# Patient Record
Sex: MALE | Race: OTHER | NOT HISPANIC OR LATINO | Employment: UNEMPLOYED | URBAN - METROPOLITAN AREA
[De-identification: names, ages, dates, MRNs, and addresses within clinical notes are randomized per-mention and may not be internally consistent; named-entity substitution may affect disease eponyms.]

---

## 2022-07-13 ENCOUNTER — APPOINTMENT (EMERGENCY)
Dept: RADIOLOGY | Facility: HOSPITAL | Age: 77
End: 2022-07-13
Payer: MEDICAID

## 2022-07-13 ENCOUNTER — HOSPITAL ENCOUNTER (OUTPATIENT)
Facility: HOSPITAL | Age: 77
Setting detail: OBSERVATION
Discharge: HOME | End: 2022-07-15
Attending: EMERGENCY MEDICINE | Admitting: FAMILY MEDICINE
Payer: MEDICAID

## 2022-07-13 DIAGNOSIS — R91.8 OPACITY OF LUNG ON IMAGING STUDY: ICD-10-CM

## 2022-07-13 DIAGNOSIS — N40.0 PROSTATE ENLARGEMENT: ICD-10-CM

## 2022-07-13 DIAGNOSIS — N13.39 OTHER HYDRONEPHROSIS: Primary | ICD-10-CM

## 2022-07-13 DIAGNOSIS — I71.40 ABDOMINAL AORTIC ANEURYSM (AAA) WITHOUT RUPTURE: ICD-10-CM

## 2022-07-13 DIAGNOSIS — N12 PYELONEPHRITIS: ICD-10-CM

## 2022-07-13 DIAGNOSIS — N42.89 PROSTATE MASS: ICD-10-CM

## 2022-07-13 PROBLEM — N13.30 HYDRONEPHROSIS OF RIGHT KIDNEY: Status: ACTIVE | Noted: 2022-07-13

## 2022-07-13 PROBLEM — I71.4 AAA (ABDOMINAL AORTIC ANEURYSM) WITHOUT RUPTURE (HCC): Status: ACTIVE | Noted: 2022-07-13

## 2022-07-13 LAB
ALBUMIN SERPL BCP-MCNC: 2.7 G/DL (ref 3.5–5)
ALP SERPL-CCNC: 108 U/L (ref 46–116)
ALT SERPL W P-5'-P-CCNC: 54 U/L (ref 12–78)
ANION GAP SERPL CALCULATED.3IONS-SCNC: 8 MMOL/L (ref 4–13)
APTT PPP: 30 SECONDS (ref 23–37)
AST SERPL W P-5'-P-CCNC: 40 U/L (ref 5–45)
BACTERIA UR QL AUTO: NORMAL /HPF
BASOPHILS # BLD AUTO: 0.03 THOUSANDS/ΜL (ref 0–0.1)
BASOPHILS NFR BLD AUTO: 0 % (ref 0–1)
BILIRUB SERPL-MCNC: 0.55 MG/DL (ref 0.2–1)
BILIRUB UR QL STRIP: NEGATIVE
BUN SERPL-MCNC: 15 MG/DL (ref 5–25)
CALCIUM ALBUM COR SERPL-MCNC: 9.7 MG/DL (ref 8.3–10.1)
CALCIUM SERPL-MCNC: 8.7 MG/DL (ref 8.3–10.1)
CHLORIDE SERPL-SCNC: 100 MMOL/L (ref 100–108)
CLARITY UR: CLEAR
CO2 SERPL-SCNC: 26 MMOL/L (ref 21–32)
COLOR UR: YELLOW
CREAT SERPL-MCNC: 1.05 MG/DL (ref 0.6–1.3)
EOSINOPHIL # BLD AUTO: 0.09 THOUSAND/ΜL (ref 0–0.61)
EOSINOPHIL NFR BLD AUTO: 1 % (ref 0–6)
ERYTHROCYTE [DISTWIDTH] IN BLOOD BY AUTOMATED COUNT: 14.3 % (ref 11.6–15.1)
GFR SERPL CREATININE-BSD FRML MDRD: 69 ML/MIN/1.73SQ M
GLUCOSE SERPL-MCNC: 110 MG/DL (ref 65–140)
GLUCOSE UR STRIP-MCNC: NEGATIVE MG/DL
HCT VFR BLD AUTO: 37.8 % (ref 36.5–49.3)
HGB BLD-MCNC: 11.9 G/DL (ref 12–17)
HGB UR QL STRIP.AUTO: ABNORMAL
IMM GRANULOCYTES # BLD AUTO: 0.05 THOUSAND/UL (ref 0–0.2)
IMM GRANULOCYTES NFR BLD AUTO: 1 % (ref 0–2)
INR PPP: 1.15 (ref 0.84–1.19)
KETONES UR STRIP-MCNC: NEGATIVE MG/DL
LACTATE SERPL-SCNC: 0.8 MMOL/L (ref 0.5–2)
LEUKOCYTE ESTERASE UR QL STRIP: NEGATIVE
LYMPHOCYTES # BLD AUTO: 1.6 THOUSANDS/ΜL (ref 0.6–4.47)
LYMPHOCYTES NFR BLD AUTO: 16 % (ref 14–44)
MCH RBC QN AUTO: 27.6 PG (ref 26.8–34.3)
MCHC RBC AUTO-ENTMCNC: 31.5 G/DL (ref 31.4–37.4)
MCV RBC AUTO: 88 FL (ref 82–98)
MONOCYTES # BLD AUTO: 1.02 THOUSAND/ΜL (ref 0.17–1.22)
MONOCYTES NFR BLD AUTO: 11 % (ref 4–12)
NEUTROPHILS # BLD AUTO: 6.95 THOUSANDS/ΜL (ref 1.85–7.62)
NEUTS SEG NFR BLD AUTO: 71 % (ref 43–75)
NITRITE UR QL STRIP: NEGATIVE
NON-SQ EPI CELLS URNS QL MICRO: NORMAL /HPF
NRBC BLD AUTO-RTO: 0 /100 WBCS
PH UR STRIP.AUTO: 6 [PH]
PLATELET # BLD AUTO: 297 THOUSANDS/UL (ref 149–390)
PMV BLD AUTO: 9.5 FL (ref 8.9–12.7)
POTASSIUM SERPL-SCNC: 4 MMOL/L (ref 3.5–5.3)
PROCALCITONIN SERPL-MCNC: 0.08 NG/ML
PROT SERPL-MCNC: 7.8 G/DL (ref 6.4–8.2)
PROT UR STRIP-MCNC: ABNORMAL MG/DL
PROTHROMBIN TIME: 14.4 SECONDS (ref 11.6–14.5)
RBC # BLD AUTO: 4.31 MILLION/UL (ref 3.88–5.62)
RBC #/AREA URNS AUTO: NORMAL /HPF
SODIUM SERPL-SCNC: 134 MMOL/L (ref 136–145)
SP GR UR STRIP.AUTO: 1.02 (ref 1–1.03)
UROBILINOGEN UR QL STRIP.AUTO: 1 E.U./DL
WBC # BLD AUTO: 9.74 THOUSAND/UL (ref 4.31–10.16)
WBC #/AREA URNS AUTO: NORMAL /HPF

## 2022-07-13 PROCEDURE — 99220 PR INITIAL OBSERVATION CARE/DAY 70 MINUTES: CPT | Performed by: FAMILY MEDICINE

## 2022-07-13 PROCEDURE — 96365 THER/PROPH/DIAG IV INF INIT: CPT

## 2022-07-13 PROCEDURE — 87040 BLOOD CULTURE FOR BACTERIA: CPT | Performed by: PHYSICIAN ASSISTANT

## 2022-07-13 PROCEDURE — 87086 URINE CULTURE/COLONY COUNT: CPT | Performed by: FAMILY MEDICINE

## 2022-07-13 PROCEDURE — 83605 ASSAY OF LACTIC ACID: CPT | Performed by: PHYSICIAN ASSISTANT

## 2022-07-13 PROCEDURE — 93005 ELECTROCARDIOGRAM TRACING: CPT

## 2022-07-13 PROCEDURE — 71045 X-RAY EXAM CHEST 1 VIEW: CPT

## 2022-07-13 PROCEDURE — 74176 CT ABD & PELVIS W/O CONTRAST: CPT

## 2022-07-13 PROCEDURE — 99285 EMERGENCY DEPT VISIT HI MDM: CPT | Performed by: PHYSICIAN ASSISTANT

## 2022-07-13 PROCEDURE — 85610 PROTHROMBIN TIME: CPT | Performed by: PHYSICIAN ASSISTANT

## 2022-07-13 PROCEDURE — 84145 PROCALCITONIN (PCT): CPT | Performed by: PHYSICIAN ASSISTANT

## 2022-07-13 PROCEDURE — 99285 EMERGENCY DEPT VISIT HI MDM: CPT

## 2022-07-13 PROCEDURE — 80053 COMPREHEN METABOLIC PANEL: CPT | Performed by: PHYSICIAN ASSISTANT

## 2022-07-13 PROCEDURE — 36415 COLL VENOUS BLD VENIPUNCTURE: CPT | Performed by: PHYSICIAN ASSISTANT

## 2022-07-13 PROCEDURE — 85730 THROMBOPLASTIN TIME PARTIAL: CPT | Performed by: PHYSICIAN ASSISTANT

## 2022-07-13 PROCEDURE — G1004 CDSM NDSC: HCPCS

## 2022-07-13 PROCEDURE — 81001 URINALYSIS AUTO W/SCOPE: CPT | Performed by: PHYSICIAN ASSISTANT

## 2022-07-13 PROCEDURE — 85025 COMPLETE CBC W/AUTO DIFF WBC: CPT | Performed by: PHYSICIAN ASSISTANT

## 2022-07-13 RX ORDER — SODIUM CHLORIDE 9 MG/ML
75 INJECTION, SOLUTION INTRAVENOUS CONTINUOUS
Status: DISCONTINUED | OUTPATIENT
Start: 2022-07-13 | End: 2022-07-14

## 2022-07-13 RX ORDER — CEFTRIAXONE 1 G/50ML
1000 INJECTION, SOLUTION INTRAVENOUS EVERY 24 HOURS
Status: DISCONTINUED | OUTPATIENT
Start: 2022-07-14 | End: 2022-07-15 | Stop reason: HOSPADM

## 2022-07-13 RX ORDER — ENOXAPARIN SODIUM 100 MG/ML
40 INJECTION SUBCUTANEOUS DAILY
Status: DISCONTINUED | OUTPATIENT
Start: 2022-07-14 | End: 2022-07-15 | Stop reason: HOSPADM

## 2022-07-13 RX ORDER — CEFTRIAXONE 2 G/50ML
2000 INJECTION, SOLUTION INTRAVENOUS ONCE
Status: COMPLETED | OUTPATIENT
Start: 2022-07-13 | End: 2022-07-13

## 2022-07-13 RX ADMIN — CEFTRIAXONE 2000 MG: 2 INJECTION, SOLUTION INTRAVENOUS at 11:58

## 2022-07-13 RX ADMIN — SODIUM CHLORIDE 75 ML/HR: 0.9 INJECTION, SOLUTION INTRAVENOUS at 21:40

## 2022-07-13 NOTE — ED PROVIDER NOTES
History  Chief Complaint   Patient presents with    Flank Pain     Pt is visiting from [place], [address] of R side of flank and abd pain for the past 5 days  Speaks tano  Denies any n/v d  reg BM today, co increases urination at [facility], otherwise healthy no meds  Patient is a 68-year-old [address] speaking male who is visiting [place] from [place] and presents for evaluation of right flank and abdominal pain for the past 5 days  Patient sources frequent urination especially at night  He denies constipation, trauma, suspicious food intake, sick contacts, recent illness, or previous abdominal surgeries  He states the pain is awakening him from sleep  He denies nausea vomiting or diarrhea        History provided by:  Patient   used: Yes (praful [id number])    Flank Pain  Pain location:  R flank  Pain quality: sharp, shooting and stabbing    Pain radiates to:  RLQ  Pain severity:  Moderate  Onset quality:  Gradual  Duration:  5 days  Timing:  Constant  Progression:  Waxing and waning  Chronicity:  New  Context: not alcohol use, not awakening from sleep, not diet changes, not eating, not laxative use, not medication withdrawal, not previous surgeries, not recent illness, not recent sexual activity, not recent travel, not retching, not sick contacts, not suspicious food intake and not trauma    Relieved by:  None tried  Worsened by:  Nothing  Ineffective treatments:  None tried  Associated symptoms: no anorexia, no belching, no chest pain, no chills, no constipation, no cough, no diarrhea, no dysuria, no fatigue, no fever, no flatus, no hematemesis, no hematochezia, no hematuria, no melena, no nausea, no shortness of breath, no sore throat, no vaginal bleeding, no vaginal discharge and no vomiting    Risk factors: no alcohol abuse, no aspirin use, not elderly, has not had multiple surgeries, no NSAID use, not obese, not pregnant and no recent hospitalization        None       No past medical history on file  No past surgical history on file  No family history on file  I have reviewed and agree with the history as documented  No existing history information found  No existing history information found  Review of Systems   Constitutional: Negative for chills, fatigue and fever  HENT: Negative for ear pain and sore throat  Eyes: Negative for pain and visual disturbance  Respiratory: Negative for cough and shortness of breath  Cardiovascular: Negative for chest pain and palpitations  Gastrointestinal: Negative for abdominal pain, anorexia, constipation, diarrhea, flatus, hematemesis, hematochezia, melena, nausea and vomiting  Endocrine: Negative  Genitourinary: Positive for flank pain, frequency and urgency  Negative for dysuria, hematuria, testicular pain, vaginal bleeding and vaginal discharge  Musculoskeletal: Positive for back pain  Negative for arthralgias  Skin: Negative for color change and rash  Allergic/Immunologic: Negative  Neurological: Negative  Negative for seizures and syncope  Hematological: Negative  Psychiatric/Behavioral: Negative  All other systems reviewed and are negative  Physical Exam  Physical Exam  Vitals and nursing note reviewed  Constitutional:       Appearance: He is well-developed  He is obese  HENT:      Head: Normocephalic and atraumatic  Nose: Nose normal       Mouth/Throat:      Mouth: Mucous membranes are moist    Eyes:      General: No scleral icterus  Right eye: No discharge  Left eye: No discharge  Conjunctiva/sclera: Conjunctivae normal       Pupils: Pupils are equal, round, and reactive to light  Cardiovascular:      Rate and Rhythm: Normal rate and regular rhythm  Pulses: Normal pulses  Heart sounds: Normal heart sounds  No murmur heard  Pulmonary:      Effort: Pulmonary effort is normal  No respiratory distress  Breath sounds: Normal breath sounds     Abdominal: General: Bowel sounds are normal  There is distension  Palpations: Abdomen is soft  There is no shifting dullness or pulsatile mass  Tenderness: There is abdominal tenderness in the right upper quadrant and right lower quadrant  There is right CVA tenderness  There is no left CVA tenderness, guarding or rebound  Hernia: No hernia is present  Musculoskeletal:         General: No swelling or tenderness  Cervical back: Normal range of motion and neck supple  No rigidity  Right lower leg: No edema  Left lower leg: No edema  Skin:     General: Skin is warm and dry  Capillary Refill: Capillary refill takes less than 2 seconds  Neurological:      General: No focal deficit present  Mental Status: He is alert and oriented to person, place, and time  Cranial Nerves: No cranial nerve deficit  Psychiatric:         Mood and Affect: Mood normal          Behavior: Behavior normal          Vital Signs  ED Triage Vitals [07/13/22 1048]   Temperature Pulse Respirations Blood Pressure SpO2   98 4 °F (36 9 °C) 94 18 168/89 95 %      Temp Source Heart Rate Source Patient Position - Orthostatic VS BP Location FiO2 (%)   Oral Monitor Sitting Right arm --      Pain Score       9           Vitals:    07/13/22 1100 07/13/22 1115 07/13/22 1145 07/13/22 1530   BP: (!) 175/76 136/65 137/74 144/83   Pulse: 92 90 90 94   Patient Position - Orthostatic VS:             Visual Acuity      ED Medications  Medications   cefTRIAXone (ROCEPHIN) IVPB (premix in dextrose) 2,000 mg 50 mL (0 mg Intravenous Stopped 7/13/22 1228)       Diagnostic Studies  Results Reviewed     Procedure Component Value Units Date/Time    Urine culture [[id number]] Collected: 07/13/22 1158    Lab Status:  In process Specimen: Urine Updated: 07/13/22 1545    Blood culture #1 [[id number]] Collected: 07/13/22 1140    Lab Status: Preliminary result Specimen: Blood from Arm, Right Updated: 07/13/22 1504     Blood Culture Received in Microbiology Lab  Culture in Progress  Blood culture #2 [[id number]] Collected: 07/13/22 1140    Lab Status: Preliminary result Specimen: Blood from Arm, Left Updated: 07/13/22 1504     Blood Culture Received in Microbiology Lab  Culture in Progress  Procalcitonin [[id number]]  (Normal) Collected: 07/13/22 1140    Lab Status: Final result Specimen: Blood from Arm, Left Updated: 07/13/22 1222     Procalcitonin 0 08 ng/ml     Lactic acid [[id number]]  (Normal) Collected: 07/13/22 1140    Lab Status: Final result Specimen: Blood from Arm, Left Updated: 07/13/22 1214     LACTIC ACID 0 8 mmol/L     Narrative:      Result may be elevated if tourniquet was used during collection      Comprehensive metabolic panel [[id number]]  (Abnormal) Collected: 07/13/22 1140    Lab Status: Final result Specimen: Blood from Arm, Left Updated: 07/13/22 1211     Sodium 134 mmol/L      Potassium 4 0 mmol/L      Chloride 100 mmol/L      CO2 26 mmol/L      ANION GAP 8 mmol/L      BUN 15 mg/dL      Creatinine 1 05 mg/dL      Glucose 110 mg/dL      Calcium 8 7 mg/dL      Corrected Calcium 9 7 mg/dL      AST 40 U/L      ALT 54 U/L      Alkaline Phosphatase 108 U/L      Total Protein 7 8 g/dL      Albumin 2 7 g/dL      Total Bilirubin 0 55 mg/dL      eGFR 69 ml/min/1 73sq m     Narrative:      [place] guidelines for Chronic Kidney Disease (CKD):     Stage 1 with normal or high GFR (GFR > 90 mL/min/1 73 square meters)    Stage 2 Mild CKD (GFR = 60-89 mL/min/1 73 square meters)    Stage 3A Moderate CKD (GFR = 45-59 mL/min/1 73 square meters)    Stage 3B Moderate CKD (GFR = 30-44 mL/min/1 73 square meters)    Stage 4 Severe CKD (GFR = 15-29 mL/min/1 73 square meters)    Stage 5 End Stage CKD (GFR <15 mL/min/1 73 square meters)  Note: GFR calculation is accurate only with a steady state creatinine    Urine Microscopic [[id number]]  (Normal) Collected: 07/13/22 1158    Lab Status: Final result Specimen: Urine, Clean Catch Updated: 07/13/22 1209     RBC, UA 1-2 /hpf      WBC, UA None Seen /hpf      Epithelial Cells None Seen /hpf      Bacteria, UA Occasional /hpf     UA w Reflex to Microscopic w Reflex to Culture [[id number]]  (Abnormal) Collected: 07/13/22 1158    Lab Status: Final result Specimen: Urine, Clean Catch Updated: 07/13/22 1206     Color, UA Yellow     Clarity, UA Clear     Specific Gravity, UA 1 025     pH, UA 6 0     Leukocytes, UA Negative     Nitrite, UA Negative     Protein, UA 30 (1+) mg/dl      Glucose, UA Negative mg/dl      Ketones, UA Negative mg/dl      Urobilinogen, UA 1 0 E U /dl      Bilirubin, UA Negative     Occult Blood, UA Trace-Intact    Protime-INR [[id number]]  (Normal) Collected: 07/13/22 1140    Lab Status: Final result Specimen: Blood from Arm, Left Updated: 07/13/22 1205     Protime 14 4 seconds      INR 1 15    APTT [[id number]]  (Normal) Collected: 07/13/22 1140    Lab Status: Final result Specimen: Blood from Arm, Left Updated: 07/13/22 1205     PTT 30 seconds     CBC and differential [[id number]]  (Abnormal) Collected: 07/13/22 1140    Lab Status: Final result Specimen: Blood from Arm, Left Updated: 07/13/22 1154     WBC 9 74 Thousand/uL      RBC 4 31 Million/uL      Hemoglobin 11 9 g/dL      Hematocrit 37 8 %      MCV 88 fL      MCH 27 6 pg      MCHC 31 5 g/dL      RDW 14 3 %      MPV 9 5 fL      Platelets 457 Thousands/uL      nRBC 0 /100 WBCs      Neutrophils Relative 71 %      Immat GRANS % 1 %      Lymphocytes Relative 16 %      Monocytes Relative 11 %      Eosinophils Relative 1 %      Basophils Relative 0 %      Neutrophils Absolute 6 95 Thousands/µL      Immature Grans Absolute 0 05 Thousand/uL      Lymphocytes Absolute 1 60 Thousands/µL      Monocytes Absolute 1 02 Thousand/µL      Eosinophils Absolute 0 09 Thousand/µL      Basophils Absolute 0 03 Thousands/µL                  XR chest portable - 1 view   Final Result by Jensen Burris MD (07/13 1247)      Findings suspicious for early right basal infiltrate                  Workstation performed: [id number]         CT abdomen pelvis wo contrast   Final Result by Germaine Plummer MD (07/13 1248)      1  Severe right hydroureteronephrosis with abrupt caliber change at distal right ureter without obvious direct extrinsic mass affect  No calculus  Associated significant right perirenal and posterior pararenal fat stranding with fluid within posterior    pararenal space  Correlate with urinalysis for infection  Recommend urology consultation and further evaluation with CT urogram       2   Enlarged prostate with mass effect upon the base of urinary bladder  Possible separate irregular mucosal thickening / mass along the posterior inferior urinary bladder wall versus continuation of enlarged prostate projection  3   Right greater than left paraortic and iliac chain lymphadenopathy  4   Right pleural effusion and small subjacent consolidation likely atelectasis  Occult infection is not excluded  5   Large infrarenal abdominal aortic aneurysm measuring 5 6 x 5 5 cm  Recommend vascular service consultation  6   Left hepatic lobe 0 6 cm indeterminate lesion  7   Chronic severe L3 compression deformity with posterior buckling of superior endplate resulting in severe canal stenosis  The study was marked in [facility] for immediate notification        Workstation performed: [id number]                    Procedures  ECG 12 Lead Documentation Only    Date/Time: 7/13/2022 4:45 PM  Performed by: Justin Mendez PA-C  Authorized by: Justin Mendez PA-C     Patient location:  ED  Previous ECG:     Previous ECG:  Unavailable  Rate:     ECG rate:  81    ECG rate assessment: normal    Rhythm:     Rhythm: sinus rhythm               ED Course  ED Course as of 07/13/22 1648   Wed Jul 13, 2022   1252 UA w Reflex to Microscopic w Reflex to Culture(!)   1305 Creatinine: 1 05 MDM  Number of Diagnoses or Management Options  Abdominal aortic aneurysm (AAA) without rupture [facility]): new and requires workup  Other hydronephrosis: new and requires workup  Prostate mass: new and requires workup  Pyelonephritis: new and requires workup  Diagnosis management comments: Hydronephrosis with possible pyelonephritis  Patient was given IV antibiotics in the ED  Hydronephrosis may be obstructive in nature-CT without evidence of stone  Patient to be admitted for perc nephrostomy versus ureteral stent depending on approach and ability    CT reveals prostate mass  Discussed with patient with  and daughter Zeus Cid  Patient will need further evaluation with a urologist    Abdominal aortic aneurysm without rupture  New finding of 5 5 x 5 6 cm a abdominal aortic aneurysm in the infrarenal space  Patient will need vascular surgery follow-up intervention       Amount and/or Complexity of Data Reviewed  Clinical lab tests: ordered and reviewed  Tests in the radiology section of CPT®: ordered and reviewed  Tests in the medicine section of CPT®: ordered and reviewed  Discussion of test results with the performing providers: yes  Decide to obtain previous medical records or to obtain history from someone other than the patient: yes  Obtain history from someone other than the patient: yes  Review and summarize past medical records: yes  Discuss the patient with other providers: yes  Independent visualization of images, tracings, or specimens: yes    Risk of Complications, Morbidity, and/or Mortality  Presenting problems: high  Diagnostic procedures: high  Management options: high    Patient Progress  Patient progress: stable      Disposition  Final diagnoses:   Other hydronephrosis   Pyelonephritis   Abdominal aortic aneurysm (AAA) without rupture ([address] )   Prostate mass     Time reflects when diagnosis was documented in both MDM as applicable and the Disposition within this note     Time User Action Codes Description Comment    7/13/2022  3:25 PM Debby Stabs Add [N13 39] Other hydronephrosis     7/13/2022  3:26 PM Debby Stabs Add [N12] Pyelonephritis     7/13/2022  3:26 PM Tonia Londono Add [I71 4] Abdominal aortic aneurysm (AAA) without rupture ([facility] [address] )     7/13/2022  3:27 PM Debby Stabs Add [N42 89] Prostate mass       ED Disposition     ED Disposition   Admit    Condition   Stable    Date/Time   Wed Jul 13, 2022  3:05 PM    Comment   Case was discussed with BEATRIZ and the patient's admission status was agreed to be Admission Status: observation status to the service of Dr Cayetano Au   Follow-up Information    None         Patient's Medications    No medications on file       No discharge procedures on file      PDMP Review     None          ED Provider  Electronically Signed by           Siva Araiza PA-C  07/13/22 5064

## 2022-07-13 NOTE — ASSESSMENT & PLAN NOTE
Patient presented to the ER with complaints of right-sided flank pain, intermittent in nature over the last week associated with urinary frequency  · On imaging noted to have severe right hydroureteronephrosis with no obvious mass/calculus  PeriRenal and posterior pararenal fat stranding noted  · Continue Flomax on discharge  · Per Urology, patient to follow-up with urologist in [place] and at this time no urological intervention recommended  Patient and family aware of the  · UA shows trace blood, protein, occasional bacteria    Received Rocephin while here   · Urine culture with mixed contaminant

## 2022-07-13 NOTE — ASSESSMENT & PLAN NOTE
On x-ray noted to have early right basilar infiltrate  · Patient with low-grade fevers and intermittent cough  · On CT scan, right pleural effusion with small consolidation was noted which is likely atelectasis  · Treated with IV Rocephin here and transitioned to [facility] on discharge  · procalcitonin X 2 neg

## 2022-07-13 NOTE — CONSULTS
H&P Exam - Urology       Patient: Bo Contreras   : 1945 Sex: male   MRN: [id number]     CSN: [id number]      History of Present Illness   HPI:  Eliz Mondragon is a 78 y o  male visiting from [place] where he resides and returning in a few days who presents right lower quadrant and right groin pain seen in the ER today undergoing CT scan confirming severe right hydronephrotic kidney as well as bilateral inguinal hernias  Patient seen in the ER today stating that his discomfort is a 3/10 he denies any voiding issues with a fair stream going 6 times a day getting up no more than once a night also found to have aortic aneurysm        Review of Systems:   Constitutional:  Negative for activity change, fever, chills and diaphoresis  HENT: Negative for hearing loss and trouble swallowing  Eyes: Negative for itching and visual disturbance  Respiratory: Negative for chest tightness and shortness of breath  Cardiovascular: Negative for chest pain, edema  Gastrointestinal: Negative for abdominal distention, na abdominal pain, constipation, diarrhea, Nausea and vomiting  Genitourinary: Negative for decreased urine volume, difficulty urinating, dysuria, enuresis, frequency, hematuria and urgency  Musculoskeletal: Negative for gait problem and myalgias  Neurological: Negative for dizziness and headaches  Hematological: Does not bruise/bleed easily  Historical Information   No past medical history on file  No past surgical history on file  Social History   Social History     Substance and Sexual Activity   Alcohol Use Not on file     Social History     Substance and Sexual Activity   Drug Use Not on file     Social History     Tobacco Use   Smoking Status Not on file   Smokeless Tobacco Not on file     Family History: No family history on file      Meds/Allergies   (Not in a hospital admission)    No Known Allergies    Objective   Vitals: /75   Pulse 95   Temp 98 1 °F (36 7 °C) (Tympanic)   Resp 18   Ht 5' 10" (1 778 m)   Wt 79 8 kg (176 lb)   SpO2 95%   BMI 25 25 kg/m²     Physical Exam:  General Alert awake   Normocephalic atraumatic PERRLA  Lungs clear bilaterally  Cardiac normal S1 normal S2  Abdomen soft, flank pain none  Bilateral inguinal hernias  Tenderness at the right external ring  Extremities no edema    I/O last 24 hours:   In: 48 [IV Piggyback:50]  Out: -     Invasive Devices  Report    Peripheral Intravenous Line  Duration           Peripheral IV 07/13/22 Right Hand <1 day                    Lab Results: CBC:   Lab Results   Component Value Date    WBC 9 74 07/13/2022    HGB 11 9 (L) 07/13/2022    HCT 37 8 07/13/2022    MCV 88 07/13/2022     07/13/2022    MCH 27 6 07/13/2022    MCHC 31 5 07/13/2022    RDW 14 3 07/13/2022    MPV 9 5 07/13/2022    NRBC 0 07/13/2022     CMP:   Lab Results   Component Value Date     07/13/2022    CO2 26 07/13/2022    BUN 15 07/13/2022    CREATININE 1 05 07/13/2022    CALCIUM 8 7 07/13/2022    AST 40 07/13/2022    ALT 54 07/13/2022    ALKPHOS 108 07/13/2022    EGFR 69 07/13/2022     Urinalysis:   Lab Results   Component Value Date    COLORU Yellow 07/13/2022    CLARITYU Clear 07/13/2022    SPECGRAV 1 025 07/13/2022    PHUR 6 0 07/13/2022    LEUKOCYTESUR Negative 07/13/2022    NITRITE Negative 07/13/2022    GLUCOSEU Negative 07/13/2022    KETONESU Negative 07/13/2022    BILIRUBINUR Negative 07/13/2022    BLOODU Trace-Intact (A) 07/13/2022     Urine Culture: No results found for: URINECX  PSA: No results found for: PSA        Assessment/ Plan:  Chronic severe right hydronephrosis  Bilateral inguinal hernias  Questionable bladder mass middle lobe prostate  Right groin discomfort 3/10  Plan  Start tamsulosin 0 4 mg  Discussed with patient returning to [place] in a few days with minimal discomfort would prefer to put off cysto retrograde possible stent till returning to [place] with a local urologist        Stephanie Cage MD

## 2022-07-13 NOTE — ASSESSMENT & PLAN NOTE
5 6 X 5 5 cm infrarenal AAA noted  · Per ER, case discussed with vascular surgery who recommended outpatient follow-up  · Findings discussed with patient and family

## 2022-07-13 NOTE — ASSESSMENT & PLAN NOTE
On imaging noted to have prostatomegaly with mass effect on base of the bladder    Mucosal thickening/mass vs continuation of prostate projection was noted along the posterior inferior bladder wall  · Urology follow-up in [place] as noted above

## 2022-07-13 NOTE — H&P
History and Physical - [facility]    Patient Information: Mason Silvestre 78 y o  male MRN: [id number]  Unit/Bed#: ED 09 Encounter: [id number]  Admitting Physician: Carl Alegria DO  PCP: No primary care provider on file  Date of Admission:  07/13/22        Hospital Problem List:     Principal Problem:    Hydronephrosis of right kidney  Active Problems:    Prostate enlargement    AAA (abdominal aortic aneurysm) without rupture (HCC)    Opacity of lung on imaging study      Assessment/Plan:    * Hydronephrosis of right kidney  Assessment & Plan  Patient presented to the ER with complaints of right-sided flank pain, intermittent in nature over the last week associated with urinary frequency  · On imaging noted to have severe right hydroureteronephrosis with no obvious mass/calculus  PeriRenal and posterior pararenal fat stranding noted  · Keep NPO after midnight for possible urologic intervention  · Supportive treatment with IVF  · UA shows trace blood, protein, occasional bacteria  Received a dose of Rocephin which will be continued pending culture results    Prostate enlargement  Assessment & Plan  On imaging noted to have prostatomegaly with mass effect on base of the bladder    Mucosal thickening/mass vs continue a shin of prostate projection was noted along the posterior inferior bladder wall  · Urology consulted    AAA (abdominal aortic aneurysm) without rupture (HCC)  Assessment & Plan  5 6 X 5 5 cm infrarenal AAA noted  · Per ER, case was discussed with vascular surgery who recommended outpatient follow-up    Opacity of lung on imaging study  Assessment & Plan  On x-ray noted to have early right basilar infiltrate  · Doubtful pneumonia as patient is afebrile with no pulmonary symptoms  · On CT scan, right pleural effusion with small consolidation was noted which is likely atelectasis  · Check procalcitonin X 2          VTE Prophylaxis: Enoxaparin (Lovenox)  / sequential compression device Code Status: full code    Anticipated Length of Stay:  Patient will be admitted on an Observation basis with an anticipated length of stay of  1 midnights  Justification for Hospital Stay:  Severe right hydroureteronephrosis    Total Time for Visit, including Counseling / Coordination of Care: 45 minutes  Greater than 50% of this total time spent on direct patient counseling and coordination of care  Chief Complaint:     Flank Pain (Pt is visiting from [place], [address] of R side of flank and abd pain for the past 5 days  Speaks tano  Denies any n/v d  reg BM today, co increases urination at [facility], otherwise healthy no meds  )    History of Present Illness:    Eliz Lisa is a 78 y o  male who presents with intermittent right-sided flank pain radiating to the right lower back over the last week  States pain level is 3/10 in intensity  Reports urinary frequency but denies any dysuria, hematuria  Denies any prior episode of similar pain  Review of Systems:    Review of Systems   Constitutional: Negative for appetite change, chills and fever  HENT: Negative for congestion  Eyes: Negative for photophobia and visual disturbance  Respiratory: Negative for chest tightness and shortness of breath  Cardiovascular: Negative for chest pain and leg swelling  Gastrointestinal: Positive for abdominal pain  Negative for diarrhea, nausea and vomiting  Genitourinary: Positive for flank pain and frequency  Negative for dysuria and hematuria  Musculoskeletal: Positive for back pain  Skin: Negative for wound  Neurological: Negative for headaches  Hematological: Does not bruise/bleed easily  Psychiatric/Behavioral: Negative for agitation  Past Medical and Surgical History:     No past medical history on file  No past surgical history on file      Meds/Allergies:    PTA meds:   None       Allergies: No Known Allergies  History:     Marital Status: /Civil Union     Substance Use History:   Social History     Substance and Sexual Activity   Alcohol Use Not on file     Social History     Tobacco Use   Smoking Status Not on file   Smokeless Tobacco Not on file     Social History     Substance and Sexual Activity   Drug Use Not on file       Family History:    No family history on file  Physical Exam:     Vitals:   Blood Pressure: 144/83 (07/13/22 1530)  Pulse: 94 (07/13/22 1530)  Temperature: 98 4 °F (36 9 °C) (07/13/22 1048)  Temp Source: Oral (07/13/22 1048)  Respirations: 18 (07/13/22 1048)  Height: 5' 10" (177 8 cm) (07/13/22 1048)  Weight - Scale: 79 8 kg (176 lb) (07/13/22 1048)  SpO2: 92 % (07/13/22 1530)    Physical Exam  Constitutional:       General: He is not in acute distress  Appearance: He is not diaphoretic  HENT:      Head: Normocephalic and atraumatic  Eyes:      General:         Right eye: No discharge  Left eye: No discharge  Cardiovascular:      Rate and Rhythm: Normal rate and regular rhythm  Pulmonary:      Effort: Pulmonary effort is normal  No respiratory distress  Breath sounds: Normal breath sounds  No wheezing or rales  Abdominal:      General: Bowel sounds are normal  There is no distension  Palpations: Abdomen is soft  Tenderness: There is abdominal tenderness (Right flank/right upper quadrant)  There is no guarding  Comments: Right CVA tenderness noted   Musculoskeletal:      Right lower leg: No edema  Left lower leg: No edema  Neurological:      Mental Status: He is alert and oriented to person, place, and time  Lab Results: I have personally reviewed pertinent reports        Results from last 7 days   Lab Units 07/13/22  1140   WBC Thousand/uL 9 74   HEMOGLOBIN g/dL 11 9*   HEMATOCRIT % 37 8   PLATELETS Thousands/uL 297   NEUTROS PCT % 71   LYMPHS PCT % 16   MONOS PCT % 11   EOS PCT % 1     Results from last 7 days   Lab Units 07/13/22  1140   POTASSIUM mmol/L 4 0   CHLORIDE mmol/L 100   CO2 mmol/L 26   BUN mg/dL 15   CREATININE mg/dL 1 05   CALCIUM mg/dL 8 7   ALK PHOS U/L 108   ALT U/L 54   AST U/L 40     Results from last 7 days   Lab Units 07/13/22  1140   INR  1 15       Imaging: I have personally reviewed pertinent reports  CT abdomen pelvis wo contrast    Result Date: 7/13/2022  Narrative: CT ABDOMEN AND PELVIS WITHOUT IV CONTRAST INDICATION:   Flank pain, kidney stone suspected flank pain  COMPARISON:  None  TECHNIQUE:  CT examination of the abdomen and pelvis was performed without intravenous contrast  This examination was performed without intravenous contrast in the context of the critical nationwide Omnipaque shortage  Axial, sagittal, and coronal 2D reformatted images were created from the source data and submitted for interpretation  Radiation dose length product (DLP) for this visit:  490 28 mGy-cm   This examination, like all CT scans performed in the [facility], was performed utilizing techniques to minimize radiation dose exposure, including the use of iterative  reconstruction and automated exposure control  Enteric contrast was administered  FINDINGS: ABDOMEN LOWER CHEST:  Partially imaged right pleural effusion and small subjacent consolidation  There is right major fissure 6 mm lymph node (series 2 image 1) LIVER/BILIARY TREE:  Left hepatic lobe 0 6 cm indeterminate hypodense lesion (series 2 image 20) GALLBLADDER:  No calcified gallstones  No pericholecystic inflammatory change  SPLEEN:  Unremarkable  PANCREAS:  Unremarkable  ADRENAL GLANDS:  Unremarkable  KIDNEYS/URETERS:  There is severe right hydroureteronephrosis with abrupt caliber change at distal ureter (series 2 image 74) without obvious direct extrinsic mass effect  No calculus  Left kidney and collecting system appear unremarkable  STOMACH AND BOWEL:  Unremarkable  APPENDIX:  A normal appendix was visualized   ABDOMINOPELVIC CAVITY:  Significant right perirenal and posterior pararenal fat stranding with fluid within posterior pararenal space  Associated right greater than left para-aortic and iliac chain lymphadenopathy  A representative node in the right pelvis measures 1 x 2 6 cm (series 2 image 75)  VESSELS:  There is a large infrarenal abdominal aortic aneurysm measuring 5 6 x 5 5 cm (series 2 image 46) PELVIS REPRODUCTIVE ORGANS:  Prostate is enlarged with mass effect on base of urinary bladder  URINARY BLADDER:  Partially distended  There is a impression of mass effect upon the base of urinary bladder from enlarged prostate  Possible separate irregular mucosal thickening / mass along the posterior inferior urinary bladder wall versus continuation of prostate projection (series 602 image 99) ABDOMINAL WALL/INGUINAL REGIONS:  Fat-containing bilateral inguinal hernias  Small fat-containing umbilical hernia  OSSEOUS STRUCTURES:  Chronic severe L3 compression deformity with posterior buckling of the superior endplate resulting in severe canal stenosis  No acute or aggressive appearing osseous abnormality  Impression: 1  Severe right hydroureteronephrosis with abrupt caliber change at distal right ureter without obvious direct extrinsic mass affect  No calculus  Associated significant right perirenal and posterior pararenal fat stranding with fluid within posterior  pararenal space  Correlate with urinalysis for infection  Recommend urology consultation and further evaluation with CT urogram  2   Enlarged prostate with mass effect upon the base of urinary bladder  Possible separate irregular mucosal thickening / mass along the posterior inferior urinary bladder wall versus continuation of enlarged prostate projection  3   Right greater than left paraortic and iliac chain lymphadenopathy  4   Right pleural effusion and small subjacent consolidation likely atelectasis  Occult infection is not excluded  5   Large infrarenal abdominal aortic aneurysm measuring 5 6 x 5 5 cm    Recommend vascular service consultation  6   Left hepatic lobe 0 6 cm indeterminate lesion  7   Chronic severe L3 compression deformity with posterior buckling of superior endplate resulting in severe canal stenosis  The study was marked in [facility] for immediate notification  Workstation performed: [id number]     XR chest portable - 1 view    Result Date: 7/13/2022  Narrative: CHEST INDICATION:   sepsis  COMPARISON:  None EXAM PERFORMED/VIEWS:  XR CHEST PORTABLE Single view FINDINGS: Right basal opacity suspicious for early infiltrate Cardiomediastinal silhouette appears unremarkable  No pneumothorax or pleural effusion  Osseous structures appear within normal limits for patient age  Impression: Findings suspicious for early right basal infiltrate Workstation performed: [id number]       XR chest portable - 1 view   Final Result      Findings suspicious for early right basal infiltrate                  Workstation performed: [id number]         CT abdomen pelvis wo contrast   Final Result      1  Severe right hydroureteronephrosis with abrupt caliber change at distal right ureter without obvious direct extrinsic mass affect  No calculus  Associated significant right perirenal and posterior pararenal fat stranding with fluid within posterior    pararenal space  Correlate with urinalysis for infection  Recommend urology consultation and further evaluation with CT urogram       2   Enlarged prostate with mass effect upon the base of urinary bladder  Possible separate irregular mucosal thickening / mass along the posterior inferior urinary bladder wall versus continuation of enlarged prostate projection  3   Right greater than left paraortic and iliac chain lymphadenopathy  4   Right pleural effusion and small subjacent consolidation likely atelectasis  Occult infection is not excluded  5   Large infrarenal abdominal aortic aneurysm measuring 5 6 x 5 5 cm  Recommend vascular service consultation        6   Left hepatic lobe 0 6 cm indeterminate lesion  7   Chronic severe L3 compression deformity with posterior buckling of superior endplate resulting in severe canal stenosis  The study was marked in [facility] for immediate notification  Workstation performed: [id number]             EKG, Pathology, and Other Studies Reviewed on Admission:   · No ekg noted    Allscripts/EPIC Records Reviewed: Yes     ** Please Note: "This note has been constructed using a voice recognition system  Therefore there may be syntax, spelling, and/or grammatical errors   Please call if you have any questions  "**

## 2022-07-14 LAB
ANION GAP SERPL CALCULATED.3IONS-SCNC: 9 MMOL/L (ref 4–13)
BACTERIA UR CULT: NORMAL
BUN SERPL-MCNC: 16 MG/DL (ref 5–25)
CALCIUM SERPL-MCNC: 8.8 MG/DL (ref 8.3–10.1)
CHLORIDE SERPL-SCNC: 100 MMOL/L (ref 100–108)
CO2 SERPL-SCNC: 24 MMOL/L (ref 21–32)
CREAT SERPL-MCNC: 0.91 MG/DL (ref 0.6–1.3)
ERYTHROCYTE [DISTWIDTH] IN BLOOD BY AUTOMATED COUNT: 14.5 % (ref 11.6–15.1)
FLUAV RNA RESP QL NAA+PROBE: NEGATIVE
FLUBV RNA RESP QL NAA+PROBE: NEGATIVE
GFR SERPL CREATININE-BSD FRML MDRD: 82 ML/MIN/1.73SQ M
GLUCOSE P FAST SERPL-MCNC: 102 MG/DL (ref 65–99)
GLUCOSE SERPL-MCNC: 102 MG/DL (ref 65–140)
HCT VFR BLD AUTO: 38.1 % (ref 36.5–49.3)
HGB BLD-MCNC: 12 G/DL (ref 12–17)
MAGNESIUM SERPL-MCNC: 2 MG/DL (ref 1.6–2.6)
MCH RBC QN AUTO: 27.6 PG (ref 26.8–34.3)
MCHC RBC AUTO-ENTMCNC: 31.5 G/DL (ref 31.4–37.4)
MCV RBC AUTO: 88 FL (ref 82–98)
PLATELET # BLD AUTO: 310 THOUSANDS/UL (ref 149–390)
PMV BLD AUTO: 9.5 FL (ref 8.9–12.7)
POTASSIUM SERPL-SCNC: 4 MMOL/L (ref 3.5–5.3)
PROCALCITONIN SERPL-MCNC: 0.08 NG/ML
RBC # BLD AUTO: 4.35 MILLION/UL (ref 3.88–5.62)
RSV RNA RESP QL NAA+PROBE: NEGATIVE
SARS-COV-2 RNA RESP QL NAA+PROBE: NEGATIVE
SODIUM SERPL-SCNC: 133 MMOL/L (ref 136–145)
WBC # BLD AUTO: 8.47 THOUSAND/UL (ref 4.31–10.16)

## 2022-07-14 PROCEDURE — 0241U HB NFCT DS VIR RESP RNA 4 TRGT: CPT | Performed by: FAMILY MEDICINE

## 2022-07-14 PROCEDURE — 84145 PROCALCITONIN (PCT): CPT | Performed by: FAMILY MEDICINE

## 2022-07-14 PROCEDURE — 99225 PR SBSQ OBSERVATION CARE/DAY 25 MINUTES: CPT | Performed by: FAMILY MEDICINE

## 2022-07-14 PROCEDURE — 83735 ASSAY OF MAGNESIUM: CPT | Performed by: FAMILY MEDICINE

## 2022-07-14 PROCEDURE — 80048 BASIC METABOLIC PNL TOTAL CA: CPT | Performed by: FAMILY MEDICINE

## 2022-07-14 PROCEDURE — 85027 COMPLETE CBC AUTOMATED: CPT | Performed by: FAMILY MEDICINE

## 2022-07-14 RX ORDER — TAMSULOSIN HYDROCHLORIDE 0.4 MG/1
0.4 CAPSULE ORAL
Status: DISCONTINUED | OUTPATIENT
Start: 2022-07-14 | End: 2022-07-15

## 2022-07-14 RX ORDER — ACETAMINOPHEN 325 MG/1
650 TABLET ORAL EVERY 6 HOURS PRN
Status: DISCONTINUED | OUTPATIENT
Start: 2022-07-14 | End: 2022-07-15 | Stop reason: HOSPADM

## 2022-07-14 RX ADMIN — ACETAMINOPHEN 650 MG: 325 TABLET ORAL at 01:28

## 2022-07-14 RX ADMIN — ENOXAPARIN SODIUM 40 MG: 40 INJECTION SUBCUTANEOUS at 08:34

## 2022-07-14 RX ADMIN — ACETAMINOPHEN 650 MG: 325 TABLET ORAL at 20:55

## 2022-07-14 RX ADMIN — CEFTRIAXONE 1000 MG: 1 INJECTION, SOLUTION INTRAVENOUS at 10:37

## 2022-07-14 RX ADMIN — TAMSULOSIN HYDROCHLORIDE 0.4 MG: 0.4 CAPSULE ORAL at 15:42

## 2022-07-14 NOTE — UTILIZATION REVIEW
Initial Clinical Review    Admission: Date/Time/Statement:   Admission Orders (From admission, onward)     Ordered        07/13/22 1505  Place in Observation  Once                      Orders Placed This Encounter   Procedures    Place in Observation     Standing Status:   Standing     Number of Occurrences:   1     Order Specific Question:   Level of Care     Answer:   Med Surg [16]     ED Arrival Information     Expected   -    Arrival   7/13/2022 10:20    Acuity   Urgent            Means of arrival   Walk-In    Escorted by   Family Member    Service   Hospitalist    Admission type   Urgent            Arrival complaint   Flank pain; Abd pain & urinary frequency           Chief Complaint   Patient presents with    Flank Pain     Pt is visiting from [place], [facility] [address] of R side of flank and abd pain for the past 5 days  Speaks tano  Denies any n/v d  reg BM today, co increases urination at [facility], otherwise healthy no meds  Initial Presentation:   57-year-old Tano speaking male who is visiting [place] from [place] and presents for evaluation of right flank and abdominal pain for the past 5 days  Patient sources frequent urination especially at night  He denies constipation, trauma, suspicious food intake, sick contacts, recent illness, or previous abdominal surgeries  He states the pain is awakening him from sleep  He denies nausea vomiting or diarrhea  Hydronephrosis of right kidney  Assessment & Plan  Patient presented to the ER with complaints of right-sided flank pain, intermittent in nature over the last week associated with urinary frequency  · On imaging noted to have severe right hydroureteronephrosis with no obvious mass/calculus  PeriRenal and posterior pararenal fat stranding noted  · Keep NPO after midnight for possible urologic intervention  · Supportive treatment with IVF  · UA shows trace blood, protein, occasional bacteria    Received a dose of Rocephin which will be continued pending culture results        ED Triage Vitals [07/13/22 1048]   Temperature Pulse Respirations Blood Pressure SpO2   98 4 °F (36 9 °C) 94 18 168/89 95 %      Temp Source Heart Rate Source Patient Position - Orthostatic VS BP Location FiO2 (%)   Oral Monitor Sitting Right arm --      Pain Score       9          Wt Readings from Last 1 Encounters:   07/13/22 79 8 kg (176 lb)     Additional Vital Signs:   Date/Time Temp Pulse Resp BP MAP (mmHg) SpO2 O2 Device Patient Position - Orthostatic VS   07/14/22 06:36:42 98 3 °F (36 8 °C) 84 -- -- -- 86 % Abnormal  -- --   07/14/22 03:05:45 99 9 °F (37 7 °C) 83 16 116/66 83 93 % -- Lying   07/13/22 22:20:40 100 6 °F (38 1 °C) Abnormal  99 18 128/75 93 90 % -- --   07/13/22 20:41:39 100 6 °F (38 1 °C) Abnormal  95 19 162/84 110 93 % -- --   07/13/22 1900 -- 98 16 140/75 100 92 % None (Room air) Sitting     Pertinent Labs/Diagnostic Test Results:   XR chest portable - 1 view   Final Result by Leopoldo Atkinson MD (07/13 1244)      Findings suspicious for early right basal infiltrate                  Workstation performed: [id number]         CT abdomen pelvis wo contrast   Final Result by King Crystal MD (07/13 1246)      1  Severe right hydroureteronephrosis with abrupt caliber change at distal right ureter without obvious direct extrinsic mass affect  No calculus  Associated significant right perirenal and posterior pararenal fat stranding with fluid within posterior    pararenal space  Correlate with urinalysis for infection  Recommend urology consultation and further evaluation with CT urogram       2   Enlarged prostate with mass effect upon the base of urinary bladder  Possible separate irregular mucosal thickening / mass along the posterior inferior urinary bladder wall versus continuation of enlarged prostate projection  3   Right greater than left paraortic and iliac chain lymphadenopathy        4   Right pleural effusion and small subjacent consolidation likely atelectasis  Occult infection is not excluded  5   Large infrarenal abdominal aortic aneurysm measuring 5 6 x 5 5 cm  Recommend vascular service consultation  6   Left hepatic lobe 0 6 cm indeterminate lesion  7   Chronic severe L3 compression deformity with posterior buckling of superior endplate resulting in severe canal stenosis  The study was marked in [facility] for immediate notification        Workstation performed: [id number]               Results from last 7 days   Lab Units 07/14/22  0548 07/13/22  1140   WBC Thousand/uL 8 47 9 74   HEMOGLOBIN g/dL 12 0 11 9*   HEMATOCRIT % 38 1 37 8   PLATELETS Thousands/uL 310 297   NEUTROS ABS Thousands/µL  --  6 95         Results from last 7 days   Lab Units 07/14/22  0548 07/13/22  1140   SODIUM mmol/L 133* 134*   POTASSIUM mmol/L 4 0 4 0   CHLORIDE mmol/L 100 100   CO2 mmol/L 24 26   ANION GAP mmol/L 9 8   BUN mg/dL 16 15   CREATININE mg/dL 0 91 1 05   EGFR ml/min/1 73sq m 82 69   CALCIUM mg/dL 8 8 8 7   MAGNESIUM mg/dL 2 0  --      Results from last 7 days   Lab Units 07/13/22  1140   AST U/L 40   ALT U/L 54   ALK PHOS U/L 108   TOTAL PROTEIN g/dL 7 8   ALBUMIN g/dL 2 7*   TOTAL BILIRUBIN mg/dL 0 55         Results from last 7 days   Lab Units 07/14/22  0548 07/13/22  1140   GLUCOSE RANDOM mg/dL 102 110             No results found for: BETA-HYDROXYBUTYRATE                           Results from last 7 days   Lab Units 07/13/22  1140   PROTIME seconds 14 4   INR  1 15   PTT seconds 30         Results from last 7 days   Lab Units 07/13/22  1140   PROCALCITONIN ng/ml 0 08     Results from last 7 days   Lab Units 07/13/22  1140   LACTIC ACID mmol/L 0 8                                         Results from last 7 days   Lab Units 07/13/22  1158   CLARITY UA  Clear   COLOR UA  Yellow   SPEC GRAV UA  1 025   PH UA  6 0   GLUCOSE UA mg/dl Negative   KETONES UA mg/dl Negative   BLOOD UA  Trace-Intact*   PROTEIN UA mg/dl 30 (1+)*   NITRITE UA Negative   BILIRUBIN UA  Negative   UROBILINOGEN UA E U /dl 1 0   LEUKOCYTES UA  Negative   WBC UA /hpf None Seen   RBC UA /hpf 1-2   BACTERIA UA /hpf Occasional   EPITHELIAL CELLS WET PREP /hpf None Seen                                 Results from last 7 days   Lab Units 07/13/22  1140   BLOOD CULTURE  Received in Microbiology Lab  Culture in Progress  Received in Microbiology Lab  Culture in Progress  ED Treatment:   Medication Administration from 07/13/2022 1018 to 07/13/2022 2032       Date/Time Order Dose Route Action     07/13/2022 1158 cefTRIAXone (ROCEPHIN) IVPB (premix in dextrose) 2,000 mg 50 mL 2,000 mg Intravenous New Bag        History reviewed  No pertinent past medical history  Present on Admission:   Hydronephrosis of right kidney   Prostate enlargement   AAA (abdominal aortic aneurysm) without rupture (HCC)   Opacity of lung on imaging study      Admitting Diagnosis: Pyelonephritis [N12]  Flank pain [R10 9]  Prostate mass [N42 89]  Other hydronephrosis [N13 39]  Abdominal aortic aneurysm (AAA) without rupture (HCC) [I71 4]  Age/Sex: 78 y o  male  Admission Orders:  Consult urology  Scd/foot pumps    Scheduled Medications:  cefTRIAXone, 1,000 mg, Intravenous, Q24H  enoxaparin, 40 mg, Subcutaneous, Daily      Continuous IV Infusions:  sodium chloride, 75 mL/hr, Intravenous, Continuous      PRN Meds:  acetaminophen, 650 mg, Oral, Q6H PRN      Network Utilization Review Department  ATTENTION: Please call with any questions or concerns to [phone] and carefully listen to the prompts so that you are directed to the right person  All voicemails are confidential   Josie Sanches all requests for admission clinical reviews, approved or denied determinations and any other requests to dedicated fax number below belonging to the campus where the patient is receiving treatment   List of dedicated fax numbers for the Facilities:  FACILITY NAME UR FAX NUMBER   ADMISSION DENIALS (Administrative/Medical Necessity) [phone]   [address] (Maternity/NICU/Pediatrics) 261 [facility],[address] Floor [facility] 40 125 [facility]  [phone]   Nathanael Courtney [address] [facility] [place] [address] [address] [place] [address] [address] [phone]

## 2022-07-14 NOTE — PROGRESS NOTES
[facility] Progress Note  Patient: Prachi Wyatt 78 y o  male   MRN: [id number]  PCP: No primary care provider on file  Unit/Bed#: [address] [place] [address] Encounter: [id number]  Date Of Visit: 07/14/22    Problem List:    Principal Problem:    Hydronephrosis of right kidney  Active Problems:    Prostate enlargement    AAA (abdominal aortic aneurysm) without rupture (HCC)    Opacity of lung on imaging study      Assessment & Plan:    * Hydronephrosis of right kidney  Assessment & Plan  Patient presented to the ER with complaints of right-sided flank pain, intermittent in nature over the last week associated with urinary frequency  · On imaging noted to have severe right hydroureteronephrosis with no obvious mass/calculus  PeriRenal and posterior pararenal fat stranding noted  · Keep NPO after midnight for possible urologic intervention  · Supportive treatment with IVF  · UA shows trace blood, protein, occasional bacteria  Received a dose of Rocephin which will be continued pending culture results    Prostate enlargement  Assessment & Plan  On imaging noted to have prostatomegaly with mass effect on base of the bladder  Mucosal thickening/mass vs continue a shin of prostate projection was noted along the posterior inferior bladder wall  · Urology consulted    AAA (abdominal aortic aneurysm) without rupture (HCC)  Assessment & Plan  5 6 X 5 5 cm infrarenal AAA noted  · Per ER, case was discussed with vascular surgery who recommended outpatient follow-up    Opacity of lung on imaging study  Assessment & Plan  On x-ray noted to have early right basilar infiltrate  · Doubtful pneumonia as patient is afebrile with no pulmonary symptoms  · On CT scan, right pleural effusion with small consolidation was noted which is likely atelectasis  · Check procalcitonin X 2            VTE Pharmacologic Prophylaxis:   Lovenox    Patient Centered Rounds: I performed bedside rounds with nursing staff today    Discussions with Specialists or Other Care Team Provider: yes     Education and Discussions with Family / Patient: Updated  (nephew) via phone  Time Spent for Care: 30 minutes  More than 50% of total time spent on counseling and coordination of care as described above  Current Length of Stay: 0 day(s)  Current Patient Status: Observation   Certification Statement: The patient will continue to require additional inpatient hospital stay due to Fever requiring monitoring overnight and awaiting cultures  Discharge Plan: Anticipate discharge tomorrow to home  Code Status: Level 1 - Full Code    Subjective:     Reports intermittent cough  Still with right-sided flank pain, mild in nature    Objective:     Vitals:   Temp (24hrs), Av 3 °F (37 4 °C), Min:98 1 °F (36 7 °C), Max:100 6 °F (38 1 °C)    Temp:  [98 1 °F (36 7 °C)-100 6 °F (38 1 °C)] 98 3 °F (36 8 °C)  HR:  [80-99] 80  Resp:  [16-19] 16  BP: (116-175)/(65-89) 134/85  SpO2:  [86 %-95 %] 92 %  Body mass index is 25 25 kg/m²  Input and Output Summary (last 24 hours): Intake/Output Summary (Last 24 hours) at 2022 0957  Last data filed at 2022 0648  Gross per 24 hour   Intake 50 ml   Output 500 ml   Net -450 ml       Physical Exam:   Physical Exam  Constitutional:       General: He is not in acute distress  Appearance: He is not diaphoretic  HENT:      Head: Normocephalic and atraumatic  Eyes:      General:         Right eye: No discharge  Left eye: No discharge  Cardiovascular:      Rate and Rhythm: Normal rate and regular rhythm  Pulmonary:      Effort: Pulmonary effort is normal  No respiratory distress  Breath sounds: Normal breath sounds  No wheezing or rales  Abdominal:      General: Bowel sounds are normal  There is no distension  Palpations: Abdomen is soft  Tenderness: There is abdominal tenderness (RLQ)  There is no guarding     Neurological:      Mental Status: He is alert and oriented to person, place, and time  Additional Data:     Labs:  Results from last 7 days   Lab Units 07/14/22  0548 07/13/22  1140   WBC Thousand/uL 8 47 9 74   HEMOGLOBIN g/dL 12 0 11 9*   HEMATOCRIT % 38 1 37 8   PLATELETS Thousands/uL 310 297   NEUTROS PCT %  --  71   LYMPHS PCT %  --  16   MONOS PCT %  --  11   EOS PCT %  --  1     Results from last 7 days   Lab Units 07/14/22  0548 07/13/22  1140   SODIUM mmol/L 133* 134*   POTASSIUM mmol/L 4 0 4 0   CHLORIDE mmol/L 100 100   CO2 mmol/L 24 26   BUN mg/dL 16 15   CREATININE mg/dL 0 91 1 05   ANION GAP mmol/L 9 8   CALCIUM mg/dL 8 8 8 7   ALBUMIN g/dL  --  2 7*   TOTAL BILIRUBIN mg/dL  --  0 55   ALK PHOS U/L  --  108   ALT U/L  --  54   AST U/L  --  40   GLUCOSE RANDOM mg/dL 102 110     Results from last 7 days   Lab Units 07/13/22  1140   INR  1 15             Results from last 7 days   Lab Units 07/14/22  0548 07/13/22  1140   LACTIC ACID mmol/L  --  0 8   PROCALCITONIN ng/ml 0 08 0 08       Lines/Drains:  Invasive Devices  Report    Peripheral Intravenous Line  Duration           Peripheral IV 07/14/22 Dorsal (posterior); Left Hand <1 day                Imaging: Reviewed radiology reports from this admission including: chest xray and abdominal/pelvic CT    Recent Cultures (last 7 days):   Results from last 7 days   Lab Units 07/13/22  1140   BLOOD CULTURE  Received in Microbiology Lab  Culture in Progress  Received in Microbiology Lab  Culture in Progress         Last 24 Hours Medication List:   Current Facility-Administered Medications   Medication Dose Route Frequency Provider Last Rate    acetaminophen  650 mg Oral Q6H PRN Calli Maynard PA-C      cefTRIAXone  1,000 mg Intravenous Q24H Radha Revankar, DO      enoxaparin  40 mg Subcutaneous Daily Radha Revankar, DO      sodium chloride  75 mL/hr Intravenous Continuous Radha Revankar, DO 75 mL/hr (07/13/22 2140)    tamsulosin  0 4 mg Oral Daily With Chuy-Alberto Revankar, DO          Today, Patient Was Seen By: 3600 Jose Brown, DO    ** Please Note: "This note has been constructed using a voice recognition system  Therefore there may be syntax, spelling, and/or grammatical errors   Please call if you have any questions  "**

## 2022-07-14 NOTE — CASE MANAGEMENT
Case Management Assessment & Discharge Planning Note    Patient name Carri Restrepo  Location [address] MRN [id number]  : 1945 Date 2022       Current Admission Date: 2022  Current Admission Diagnosis:Hydronephrosis of right kidney   Patient Active Problem List    Diagnosis Date Noted    Hydronephrosis of right kidney 2022    Prostate enlargement 2022    AAA (abdominal aortic aneurysm) without rupture ([facility] [address] ) 2022    Opacity of lung on imaging study 2022      LOS (days): 0  Geometric Mean LOS (GMLOS) (days):   Days to GMLOS:     OBJECTIVE:        Current admission status: Observation  Referral Reason: Other (Discharge planning)    Preferred Pharmacy:   PATIENT/FAMILY REPORTS NO PREFERRED PHARMACY  No address on file      [facility], [address] [place] [address] [address] [facility] [address] [facility]  Phone: [phone] Fax: [phone]    Primary Care Provider: No primary care provider on file  Primary Insurance[place] [facility]  Secondary Insurance:     ASSESSMENT:  [address] [address]   Primary Phone: [phone] (Mobile)               Obs Notice Signed: 22 (Notice given by registration per chart)    Readmission Root Cause  30 Day Readmission: No    Patient Information  Admitted from[de-identified] Other (comment) (Patient is visiting relatives in [address] [place] [address] but lives in [place])  Mental Status: Alert  During Assessment patient was accompanied by:  Other-Comment (Rudy Lee)  Assessment information provided by[de-identified] Other - please comment (Rudy Lee)  Primary Caregiver: Self  Support Systems: Family members  [place] of Residence: Other (specify in comment box)  [facility] [facility]  Type of Current Residence: Apartment    Activities of Daily Living Prior to Admission  Functional Status: Independent  Completes ADLs independently?: Yes  Ambulates independently?: Yes  Does patient use assisted devices?: No  Does patient currently own DME?: No    Patient Information Continued  Does patient have prescription coverage?: Yes  Does patient receive dialysis treatments?: No    Means of Transportation  Means of Transport to Appts[de-identified] Family transport    DISCHARGE DETAILS:    Discharge planning discussed with[de-identified] Ovidio Lee  Freedom of Choice: Yes  Comments - Freedom of Choice: FOC reviewed with nephew  At this time there are no anticipated CM needs for discharge  CM contacted family/caregiver?: Yes  Were Treatment Team discharge recommendations reviewed with patient/caregiver?: Yes  Did patient/caregiver verbalize understanding of patient care needs?: Yes  Were patient/caregiver advised of the risks associated with not following Treatment Team discharge recommendations?: Yes    Contacts  Patient Contacts: Jass Driscoll (nephew)  Relationship to Patient[de-identified] Family  Contact Method: In Person  Reason/Outcome: Emergency Contact, Discharge [address]         Is the patient interested in [address] at discharge?: No    DME Referral Provided  Referral made for DME?: No    Other Referral/Resources/Interventions Provided:  Interventions: None Indicated    Would you like to participate in our [address] service program?  : No - Declined    Treatment Team Recommendation: Home  Discharge Destination Plan[de-identified] Home  Transport at Discharge : Family      SW spoke with ovidio Lee at bedside to introduce role of CM and conduct brief screening  Patient's primary language is Reybi per chart and he was in agreement with WILLY speaking with Jesus who translated  Patient is a resident of [place] and was visiting relatives in [place] when he was admitted to the hospital   He lives with his daughter in [place] and his daughter provides transportation to appointments  Patient's plan is to fly back to [place] shortly after discharge to follow up with his own doctors  Family will pick him up from the hospital when discharged  Patient is independent at baseline and uses no DME  There are no anticipated CM needs for discharge  SW will continue to follow

## 2022-07-14 NOTE — PLAN OF CARE

## 2022-07-15 VITALS
OXYGEN SATURATION: 90 % | HEIGHT: 70 IN | WEIGHT: 176 LBS | HEART RATE: 102 BPM | TEMPERATURE: 100 F | DIASTOLIC BLOOD PRESSURE: 75 MMHG | BODY MASS INDEX: 25.2 KG/M2 | RESPIRATION RATE: 17 BRPM | SYSTOLIC BLOOD PRESSURE: 134 MMHG

## 2022-07-15 LAB
ANION GAP SERPL CALCULATED.3IONS-SCNC: 10 MMOL/L (ref 4–13)
BUN SERPL-MCNC: 13 MG/DL (ref 5–25)
CALCIUM SERPL-MCNC: 8.1 MG/DL (ref 8.3–10.1)
CHLORIDE SERPL-SCNC: 101 MMOL/L (ref 100–108)
CO2 SERPL-SCNC: 23 MMOL/L (ref 21–32)
CREAT SERPL-MCNC: 0.93 MG/DL (ref 0.6–1.3)
GFR SERPL CREATININE-BSD FRML MDRD: 80 ML/MIN/1.73SQ M
GLUCOSE SERPL-MCNC: 134 MG/DL (ref 65–140)
POTASSIUM SERPL-SCNC: 3.8 MMOL/L (ref 3.5–5.3)
SODIUM SERPL-SCNC: 134 MMOL/L (ref 136–145)

## 2022-07-15 PROCEDURE — 99217 PR OBSERVATION CARE DISCHARGE MANAGEMENT: CPT | Performed by: FAMILY MEDICINE

## 2022-07-15 PROCEDURE — 80048 BASIC METABOLIC PNL TOTAL CA: CPT | Performed by: FAMILY MEDICINE

## 2022-07-15 RX ORDER — TAMSULOSIN HYDROCHLORIDE 0.4 MG/1
0.4 CAPSULE ORAL
Qty: 30 CAPSULE | Refills: 0 | Status: SHIPPED | OUTPATIENT
Start: 2022-07-15

## 2022-07-15 RX ORDER — TAMSULOSIN HYDROCHLORIDE 0.4 MG/1
0.4 CAPSULE ORAL ONCE
Status: COMPLETED | OUTPATIENT
Start: 2022-07-15 | End: 2022-07-15

## 2022-07-15 RX ORDER — CEFPODOXIME PROXETIL 200 MG/1
200 TABLET, FILM COATED ORAL 2 TIMES DAILY
Qty: 8 TABLET | Refills: 0 | Status: SHIPPED | OUTPATIENT
Start: 2022-07-16 | End: 2022-07-20

## 2022-07-15 RX ORDER — ACETAMINOPHEN 325 MG/1
650 TABLET ORAL EVERY 6 HOURS PRN
Refills: 0
Start: 2022-07-15

## 2022-07-15 RX ADMIN — CEFTRIAXONE 1000 MG: 1 INJECTION, SOLUTION INTRAVENOUS at 11:56

## 2022-07-15 RX ADMIN — ENOXAPARIN SODIUM 40 MG: 40 INJECTION SUBCUTANEOUS at 08:33

## 2022-07-15 RX ADMIN — TAMSULOSIN HYDROCHLORIDE 0.4 MG: 0.4 CAPSULE ORAL at 14:01

## 2022-07-15 NOTE — PLAN OF CARE
Problem: Potential for Falls  Goal: Patient will remain free of falls  Description: INTERVENTIONS:  - Educate patient/family on patient safety including physical limitations  - Instruct patient to call for assistance with activity   - Consult OT/PT to assist with strengthening/mobility   - Keep Call bell within reach  - Keep bed low and locked with side rails adjusted as appropriate  - Keep care items and personal belongings within reach  - Initiate and maintain comfort rounds    7/15/2022 1340 by Yas Lugo RN  Outcome: Adequate for Discharge  7/15/2022 1106 by Yas Lugo RN  Outcome: Progressing     Problem: PAIN - ADULT  Goal: Verbalizes/displays adequate comfort level or baseline comfort level  Description: Interventions:  - Encourage patient to monitor pain and request assistance  - Assess pain using appropriate pain scale  - Administer analgesics based on type and severity of pain and evaluate response  - Implement non-pharmacological measures as appropriate and evaluate response  - Consider cultural and social influences on pain and pain management  - Notify physician/advanced practitioner if interventions unsuccessful or patient reports new pain  7/15/2022 1340 by Yas Lugo RN  Outcome: Adequate for Discharge  7/15/2022 1106 by Yas Lugo RN  Outcome: Progressing     Problem: INFECTION - ADULT  Goal: Absence or prevention of progression during hospitalization  Description: INTERVENTIONS:  - Assess and monitor for signs and symptoms of infection  - Monitor lab/diagnostic results  - Monitor all insertion sites, i e  indwelling lines, tubes, and drains  - Monitor endotracheal if appropriate and nasal secretions for changes in amount and color  - [place] appropriate cooling/warming therapies per order  - Administer medications as ordered  - Instruct and encourage patient and family to use good hand hygiene technique  - Identify and instruct in appropriate isolation precautions for identified infection/condition  7/15/2022 1340 by Jasmyn House RN  Outcome: Adequate for Discharge  7/15/2022 1106 by Jasmyn House RN  Outcome: Progressing     Problem: SAFETY ADULT  Goal: Maintain or return to baseline ADL function  Description: INTERVENTIONS:  -  Assess patient's ability to carry out ADLs; assess patient's baseline for ADL function and identify physical deficits which impact ability to perform ADLs (bathing, care of mouth/teeth, toileting, grooming, dressing, etc )  - Assess/evaluate cause of self-care deficits   - Assess range of motion  - Assess patient's mobility; develop plan if impaired  - Assess patient's need for assistive devices and provide as appropriate  - Encourage maximum independence but intervene and supervise when necessary  - Involve family in performance of ADLs  - Assess for home care needs following discharge   - Consider OT consult to assist with ADL evaluation and planning for discharge  - Provide patient education as appropriate  7/15/2022 1340 by Jasmyn House RN  Outcome: Adequate for Discharge  7/15/2022 1106 by Jasmyn House RN  Outcome: Progressing  Goal: Maintains/Returns to pre admission functional level  Description: INTERVENTIONS:  - Perform BMAT or MOVE assessment daily    - Set and communicate daily mobility goal to care team and patient/family/caregiver     - Collaborate with rehabilitation services on mobility goals if consulted  -   - Out of bed for toileting  - Record patient progress and toleration of activity level   7/15/2022 1340 by Jasmyn House RN  Outcome: Adequate for Discharge  7/15/2022 1106 by Jasmyn House RN  Outcome: Progressing     Problem: DISCHARGE PLANNING  Goal: Discharge to home or other facility with appropriate resources  Description: INTERVENTIONS:  - Identify barriers to discharge w/patient and caregiver  - Arrange for needed discharge resources and transportation as appropriate  - Identify discharge learning needs (meds, wound care, etc )  - Arrange for interpretive services to assist at discharge as needed  - Refer to Case Management Department for coordinating discharge planning if the patient needs post-hospital services based on physician/advanced practitioner order or complex needs related to functional status, cognitive ability, or social support system  7/15/2022 1340 by Edwige Olvera RN  Outcome: Adequate for Discharge  7/15/2022 1106 by Edwige Olvera RN  Outcome: Progressing     Problem: Knowledge Deficit  Goal: Patient/family/caregiver demonstrates understanding of disease process, treatment plan, medications, and discharge instructions  Description: Complete learning assessment and assess knowledge base    Interventions:  - Provide teaching at level of understanding  - Provide teaching via preferred learning methods  7/15/2022 1340 by Edwige Olvera RN  Outcome: Adequate for Discharge  7/15/2022 1106 by Edwige Olvera RN  Outcome: Progressing

## 2022-07-15 NOTE — DISCHARGE SUMMARY
Discharge Summary - [facility]    Patient Information: Manuel Mart 78 y o  male MRN: [id number]  Unit/Bed#: [address] Encounter: [id number]    Discharging Physician / Practitioner: Kt Palomo DO  PCP: No primary care provider on file  Admission Date: 7/13/2022  Discharge Date: 07/15/22    Reason for Admission: Flank Pain (Pt is visiting from [place], co of R side of flank and abd pain for the past 5 days  Speaks tano  Denies any n/v d  reg BM today, co increases urination at [facility], otherwise healthy no meds  )      Discharge Diagnoses:     Principal Problem:    Hydronephrosis of right kidney  Active Problems:    Prostate enlargement    Opacity of lung on imaging study    AAA (abdominal aortic aneurysm) without rupture (HCC)  Resolved Problems:    * No resolved hospital problems  *        * Hydronephrosis of right kidney  Assessment & Plan  Patient presented to the ER with complaints of right-sided flank pain, intermittent in nature over the last week associated with urinary frequency  · On imaging noted to have severe right hydroureteronephrosis with no obvious mass/calculus  PeriRenal and posterior pararenal fat stranding noted  · Continue Flomax on discharge  · Per Urology, patient to follow-up with urologist in [place] and at this time no urological intervention recommended  Patient and family aware of the  · UA shows trace blood, protein, occasional bacteria    Received Rocephin while here   · Urine culture with mixed contaminant    Opacity of lung on imaging study  Assessment & Plan  On x-ray noted to have early right basilar infiltrate  · Patient with low-grade fevers and intermittent cough  · On CT scan, right pleural effusion with small consolidation was noted which is likely atelectasis  · Treated with IV Rocephin here and transitioned to Vantin on discharge  · procalcitonin X 2 neg    Prostate enlargement  Assessment & Plan  On imaging noted to have prostatomegaly with mass effect on base of the bladder  Mucosal thickening/mass vs continuation of prostate projection was noted along the posterior inferior bladder wall  · Urology follow-up in [place] as noted above    AAA (abdominal aortic aneurysm) without rupture ([facility] [address] )  Assessment & Plan  5 6 X 5 5 cm infrarenal AAA noted  · Per ER, case discussed with vascular surgery who recommended outpatient follow-up  · Findings discussed with patient and family      Consultations During Hospital Stay:  IP CONSULT TO UROLOGY    Procedures Performed:     · none    Significant Findings:     · Refer to hospital course and above listed diagnosis related plan for details    Imaging while in hospital:    CT abdomen pelvis wo contrast    Result Date: 7/13/2022  Narrative: CT ABDOMEN AND PELVIS WITHOUT IV CONTRAST INDICATION:   Flank pain, kidney stone suspected flank pain  COMPARISON:  None  TECHNIQUE:  CT examination of the abdomen and pelvis was performed without intravenous contrast  This examination was performed without intravenous contrast in the context of the critical nationwide Omnipaque shortage  Axial, sagittal, and coronal 2D reformatted images were created from the source data and submitted for interpretation  Radiation dose length product (DLP) for this visit:  490 28 mGy-cm   This examination, like all CT scans performed in the [facility], was performed utilizing techniques to minimize radiation dose exposure, including the use of iterative  reconstruction and automated exposure control  Enteric contrast was administered  FINDINGS: ABDOMEN LOWER CHEST:  Partially imaged right pleural effusion and small subjacent consolidation  There is right major fissure 6 mm lymph node (series 2 image 1) LIVER/BILIARY TREE:  Left hepatic lobe 0 6 cm indeterminate hypodense lesion (series 2 image 20) GALLBLADDER:  No calcified gallstones  No pericholecystic inflammatory change  SPLEEN:  Unremarkable   PANCREAS: Unremarkable  ADRENAL GLANDS:  Unremarkable  KIDNEYS/URETERS:  There is severe right hydroureteronephrosis with abrupt caliber change at distal ureter (series 2 image 74) without obvious direct extrinsic mass effect  No calculus  Left kidney and collecting system appear unremarkable  STOMACH AND BOWEL:  Unremarkable  APPENDIX:  A normal appendix was visualized  ABDOMINOPELVIC CAVITY:  Significant right perirenal and posterior pararenal fat stranding with fluid within posterior pararenal space  Associated right greater than left para-aortic and iliac chain lymphadenopathy  A representative node in the right pelvis measures 1 x 2 6 cm (series 2 image 75)  VESSELS:  There is a large infrarenal abdominal aortic aneurysm measuring 5 6 x 5 5 cm (series 2 image 46) PELVIS REPRODUCTIVE ORGANS:  Prostate is enlarged with mass effect on base of urinary bladder  URINARY BLADDER:  Partially distended  There is a impression of mass effect upon the base of urinary bladder from enlarged prostate  Possible separate irregular mucosal thickening / mass along the posterior inferior urinary bladder wall versus continuation of prostate projection (series 602 image 99) ABDOMINAL WALL/INGUINAL REGIONS:  Fat-containing bilateral inguinal hernias  Small fat-containing umbilical hernia  OSSEOUS STRUCTURES:  Chronic severe L3 compression deformity with posterior buckling of the superior endplate resulting in severe canal stenosis  No acute or aggressive appearing osseous abnormality  Impression: 1  Severe right hydroureteronephrosis with abrupt caliber change at distal right ureter without obvious direct extrinsic mass affect  No calculus  Associated significant right perirenal and posterior pararenal fat stranding with fluid within posterior  pararenal space  Correlate with urinalysis for infection    Recommend urology consultation and further evaluation with CT urogram  2   Enlarged prostate with mass effect upon the base of urinary bladder  Possible separate irregular mucosal thickening / mass along the posterior inferior urinary bladder wall versus continuation of enlarged prostate projection  3   Right greater than left paraortic and iliac chain lymphadenopathy  4   Right pleural effusion and small subjacent consolidation likely atelectasis  Occult infection is not excluded  5   Large infrarenal abdominal aortic aneurysm measuring 5 6 x 5 5 cm  Recommend vascular service consultation  6   Left hepatic lobe 0 6 cm indeterminate lesion  7   Chronic severe L3 compression deformity with posterior buckling of superior endplate resulting in severe canal stenosis  The study was marked in [facility] for immediate notification  Workstation performed: [id number]     XR chest portable - 1 view    Result Date: 7/13/2022  Narrative: CHEST INDICATION:   sepsis  COMPARISON:  None EXAM PERFORMED/VIEWS:  XR CHEST PORTABLE Single view FINDINGS: Right basal opacity suspicious for early infiltrate Cardiomediastinal silhouette appears unremarkable  No pneumothorax or pleural effusion  Osseous structures appear within normal limits for patient age  Impression: Findings suspicious for early right basal infiltrate Workstation performed: [id number]       Incidental Findings:   · Severe right hydroureteronephrosis  · Enlarged prostate  · Large infrarenal abdominal aortic aneurysm  · Left hepatic lobe indeterminate lesion    Test Results Pending at Discharge (will require follow up):   · As per After Visit Summary     Outpatient Tests Requested:  · none    Complications:  Refer to hospital course and above listed diagnosis related plan, if any    Hospital Course:     Amie Sher is a 78 y o  male patient who originally presented to the hospital on 7/13/2022 due to Intermittent right-sided flank pain radiating to the right lower back over the last week  Pain was 3/10 in intensity at its worse  Reported urinary frequency    On imaging patient was noted to have severe right-sided hydroureteronephrosis with prostatomegaly and patient was admitted  He was seen by Urology while here who recommended that he follow up with Urology in [place] where he is planning to go shortly  Treated with IV antibiotic while here  Blood cultures negative  Cleared by all consultants involved in his care prior to discharge  Discharge plan discussed in details with patient and his nephew Jesus over the phone    Please see above list of diagnoses and related plan for additional information  Condition at Discharge: stable     Discharge Day Visit / Exam:     Subjective:  Reports 3-4/10 intermittent right-sided abdominal/flank pain  Feels comfortable going home and states that he will follow-up when he is back in [place] which will be very soon    Vitals: Blood Pressure: 134/75 (07/15/22 0826)  Pulse: 102 (07/15/22 0826)  Temperature: 100 °F (37 8 °C) (07/15/22 0826)  Temp Source: Oral (07/15/22 0826)  Respirations: 17 (07/15/22 0826)  Height: 5' 10" (177 8 cm) (07/13/22 1048)  Weight - Scale: 79 8 kg (176 lb) (07/13/22 1048)  SpO2: 90 % (07/15/22 0826)  Exam:   Physical Exam  Constitutional:       General: He is not in acute distress  Appearance: He is well-developed  HENT:      Head: Normocephalic and atraumatic  Eyes:      General: No scleral icterus  Cardiovascular:      Rate and Rhythm: Normal rate and regular rhythm  Pulmonary:      Effort: Pulmonary effort is normal  No respiratory distress  Breath sounds: No wheezing or rales  Comments: Decreased breath sounds bilaterally  Abdominal:      General: Bowel sounds are normal  There is no distension  Palpations: Abdomen is soft  Tenderness: There is abdominal tenderness (mild RLQ)  There is no guarding  Neurological:      Mental Status: He is alert and oriented to person, place, and time           Discharge instructions/Information to patient and family:(Discharge Medications and Follow up): See after visit summary for information provided to patient and family  Provisions for Follow-Up Care:  See after visit summary for information related to follow-up care and any pertinent home health orders  Disposition: Home    Planned Readmission:  No     Discharge Statement:  I spent 25 minutes discharging the patient  This time was spent on the day of discharge  I had direct contact with the patient on the day of discharge  Greater than 50% of the total time was spent examining patient, answering all patient questions, arranging and discussing plan of care with patient as well as directly providing post-discharge instructions  Additional time then spent on discharge activities  Discharge Medications:  See after visit summary for reconciled discharge medications provided to patient and family  ** Please Note: "This note has been constructed using a voice recognition system  Therefore there may be syntax, spelling, and/or grammatical errors   Please call if you have any questions  "**

## 2022-07-15 NOTE — PLAN OF CARE
Problem: Potential for Falls  Goal: Patient will remain free of falls  Description: INTERVENTIONS:  - Educate patient/family on patient safety including physical limitations  - Instruct patient to call for assistance with activity   - Consult OT/PT to assist with strengthening/mobility   - Keep Call bell within reach  - Keep bed low and locked with side rails adjusted as appropriate  - Keep care items and personal belongings within reach  - Initiate and maintain comfort rounds    Outcome: Progressing     Problem: PAIN - ADULT  Goal: Verbalizes/displays adequate comfort level or baseline comfort level  Description: Interventions:  - Encourage patient to monitor pain and request assistance  - Assess pain using appropriate pain scale  - Administer analgesics based on type and severity of pain and evaluate response  - Implement non-pharmacological measures as appropriate and evaluate response  - Consider cultural and social influences on pain and pain management  - Notify physician/advanced practitioner if interventions unsuccessful or patient reports new pain  Outcome: Progressing     Problem: INFECTION - ADULT  Goal: Absence or prevention of progression during hospitalization  Description: INTERVENTIONS:  - Assess and monitor for signs and symptoms of infection  - Monitor lab/diagnostic results  - Monitor all insertion sites, i e  indwelling lines, tubes, and drains  - Monitor endotracheal if appropriate and nasal secretions for changes in amount and color  - [place] appropriate cooling/warming therapies per order  - Administer medications as ordered  - Instruct and encourage patient and family to use good hand hygiene technique  - Identify and instruct in appropriate isolation precautions for identified infection/condition  Outcome: Progressing     Problem: SAFETY ADULT  Goal: Maintain or return to baseline ADL function  Description: INTERVENTIONS:  -  Assess patient's ability to carry out ADLs; assess patient's baseline for ADL function and identify physical deficits which impact ability to perform ADLs (bathing, care of mouth/teeth, toileting, grooming, dressing, etc )  - Assess/evaluate cause of self-care deficits   - Assess range of motion  - Assess patient's mobility; develop plan if impaired  - Assess patient's need for assistive devices and provide as appropriate  - Encourage maximum independence but intervene and supervise when necessary  - Involve family in performance of ADLs  - Assess for home care needs following discharge   - Consider OT consult to assist with ADL evaluation and planning for discharge  - Provide patient education as appropriate  Outcome: Progressing  Goal: Maintains/Returns to pre admission functional level  Description: INTERVENTIONS:  - Perform BMAT or MOVE assessment daily    - Set and communicate daily mobility goal to care team and patient/family/caregiver     - Collaborate with rehabilitation services on mobility goals if consulted  -   - Out of bed for toileting  - Record patient progress and toleration of activity level   Outcome: Progressing     Problem: DISCHARGE PLANNING  Goal: Discharge to home or other facility with appropriate resources  Description: INTERVENTIONS:  - Identify barriers to discharge w/patient and caregiver  - Arrange for needed discharge resources and transportation as appropriate  - Identify discharge learning needs (meds, wound care, etc )  - Arrange for interpretive services to assist at discharge as needed  - Refer to Case Management Department for coordinating discharge planning if the patient needs post-hospital services based on physician/advanced practitioner order or complex needs related to functional status, cognitive ability, or social support system  Outcome: Progressing     Problem: Knowledge Deficit  Goal: Patient/family/caregiver demonstrates understanding of disease process, treatment plan, medications, and discharge instructions  Description: Complete learning assessment and assess knowledge base    Interventions:  - Provide teaching at level of understanding  - Provide teaching via preferred learning methods  Outcome: Progressing

## 2022-07-15 NOTE — PLAN OF CARE
Problem: PAIN - ADULT  Goal: Verbalizes/displays adequate comfort level or baseline comfort level  Description: Interventions:  - Encourage patient to monitor pain and request assistance  - Assess pain using appropriate pain scale  - Administer analgesics based on type and severity of pain and evaluate response  - Implement non-pharmacological measures as appropriate and evaluate response  - Consider cultural and social influences on pain and pain management  - Notify physician/advanced practitioner if interventions unsuccessful or patient reports new pain  Outcome: Progressing     Problem: Potential for Falls  Goal: Patient will remain free of falls  Description: INTERVENTIONS:  - Educate patient/family on patient safety including physical limitations  - Instruct patient to call for assistance with activity   - Consult OT/PT to assist with strengthening/mobility   - Keep Call bell within reach  - Keep bed low and locked with side rails adjusted as appropriate  - Keep care items and personal belongings within reach  - Initiate and maintain comfort rounds    Outcome: Progressing     Problem: INFECTION - ADULT  Goal: Absence or prevention of progression during hospitalization  Description: INTERVENTIONS:  - Assess and monitor for signs and symptoms of infection  - Monitor lab/diagnostic results  - Monitor all insertion sites, i e  indwelling lines, tubes, and drains  - Monitor endotracheal if appropriate and nasal secretions for changes in amount and color  - [place] appropriate cooling/warming therapies per order  - Administer medications as ordered  - Instruct and encourage patient and family to use good hand hygiene technique  - Identify and instruct in appropriate isolation precautions for identified infection/condition  Outcome: Progressing     Problem: SAFETY ADULT  Goal: Maintains/Returns to pre admission functional level  Description: INTERVENTIONS:  - Perform BMAT or MOVE assessment daily    - Set and communicate daily mobility goal to care team and patient/family/caregiver  - Collaborate with rehabilitation services on mobility goals if consulted  -   - Out of bed for toileting  - Record patient progress and toleration of activity level   Outcome: Progressing     Problem: DISCHARGE PLANNING  Goal: Discharge to home or other facility with appropriate resources  Description: INTERVENTIONS:  - Identify barriers to discharge w/patient and caregiver  - Arrange for needed discharge resources and transportation as appropriate  - Identify discharge learning needs (meds, wound care, etc )  - Arrange for interpretive services to assist at discharge as needed  - Refer to Case Management Department for coordinating discharge planning if the patient needs post-hospital services based on physician/advanced practitioner order or complex needs related to functional status, cognitive ability, or social support system  Outcome: Progressing     Problem: Knowledge Deficit  Goal: Patient/family/caregiver demonstrates understanding of disease process, treatment plan, medications, and discharge instructions  Description: Complete learning assessment and assess knowledge base    Interventions:  - Provide teaching at level of understanding  - Provide teaching via preferred learning methods  Outcome: Progressing

## 2022-07-15 NOTE — INCIDENTAL FINDINGS
The following findings require follow up:  Radiographic finding  ·  Finding: Severe right hydroureteronephrosis  ·                     Enlarged prostate  ·                     Large infrarenal abdominal aortic aneurysm                            Left hepatic lobe indeterminate lesion                 Follow up required:  With your primary care doctor, urologist, vascular surgeon

## 2022-07-16 LAB
ATRIAL RATE: 81 BPM
P AXIS: 35 DEGREES
PR INTERVAL: 154 MS
QRS AXIS: 9 DEGREES
QRSD INTERVAL: 76 MS
QT INTERVAL: 362 MS
QTC INTERVAL: 420 MS
T WAVE AXIS: 19 DEGREES
VENTRICULAR RATE: 81 BPM

## 2022-07-16 PROCEDURE — 93010 ELECTROCARDIOGRAM REPORT: CPT | Performed by: INTERNAL MEDICINE

## 2022-07-18 LAB
BACTERIA BLD CULT: NORMAL
BACTERIA BLD CULT: NORMAL

## 2022-07-19 NOTE — UTILIZATION REVIEW
Notification of Observation Care Status/Observation Authorization Request  This is a Notification of Observation Care Status to our facility [address]  Please be advised that this patient is currently in our facility under Observation Status  Below you will find the Attending Physician and Facilitys information including NPI# and contact information for the Utilization  assigned to the [facility] where the patient is receiving services  Please feel free to contact the Utilization Review Department with any questions  Place of Service Code: 25  Place of Service Name: On [facility]  CPT Code for Observation:   HOURS IN OBSERVATION STATUS: 48 Hours  Initial Clinical Review    Admission: Date/Time/Statement:   Admission Orders (From admission, onward)     Ordered        07/13/22 1505  Place in Observation  Once                      Orders Placed This Encounter   Procedures    Place in Observation     Standing Status:   Standing     Number of Occurrences:   1     Order Specific Question:   Level of Care     Answer:   Med Surg [16]     ED Arrival Information     Expected   -    Arrival   7/13/2022 10:20    Acuity   Urgent            Means of arrival   Walk-In    Escorted by   Family Member    Service   Hospitalist    Admission type   Urgent            Arrival complaint   Flank pain; Abd pain & urinary frequency           Chief Complaint   Patient presents with    Flank Pain     Pt is visiting from [facility] 18 of R side of flank and abd pain for the past 5 days  Speaks tano  Denies any n/v d  reg BM today, co increases urination at nigh, otherwise healthy no meds  Initial Presentation:   81-year-old Tano speaking male who is visiting [place] from [place] and presents for evaluation of right flank and abdominal pain for the past 5 days  Patient sources frequent urination especially at night    He denies constipation, trauma, suspicious food intake, sick contacts, recent illness, or previous abdominal surgeries  He states the pain is awakening him from sleep  He denies nausea vomiting or diarrhea  Hydronephrosis of right kidney  Assessment & Plan  Patient presented to the ER with complaints of right-sided flank pain, intermittent in nature over the last week associated with urinary frequency  · On imaging noted to have severe right hydroureteronephrosis with no obvious mass/calculus  PeriRenal and posterior pararenal fat stranding noted  · Keep NPO after midnight for possible urologic intervention  · Supportive treatment with IVF  · UA shows trace blood, protein, occasional bacteria  Received a dose of Rocephin which will be continued pending culture results        ED Triage Vitals [07/13/22 1048]   Temperature Pulse Respirations Blood Pressure SpO2   98 4 °F (36 9 °C) 94 18 168/89 95 %      Temp Source Heart Rate Source Patient Position - Orthostatic VS BP Location FiO2 (%)   Oral Monitor Sitting Right arm --      Pain Score       9          Wt Readings from Last 1 Encounters:   07/13/22 79 8 kg (176 lb)     Additional Vital Signs:   Date/Time Temp Pulse Resp BP MAP (mmHg) SpO2 O2 Device Patient Position - Orthostatic VS   07/14/22 06:36:42 98 3 °F (36 8 °C) 84 -- -- -- 86 % Abnormal  -- --   07/14/22 03:05:45 99 9 °F (37 7 °C) 83 16 116/66 83 93 % -- Lying   07/13/22 22:20:40 100 6 °F (38 1 °C) Abnormal  99 18 128/75 93 90 % -- --   07/13/22 20:41:39 100 6 °F (38 1 °C) Abnormal  95 19 162/84 110 93 % -- --   07/13/22 1900 -- 98 16 140/75 100 92 % None (Room air) Sitting     Pertinent Labs/Diagnostic Test Results:   XR chest portable - 1 view   Final Result by Kendall Joel MD (07/13 9917)      Findings suspicious for early right basal infiltrate                  Workstation performed: [id number]         CT abdomen pelvis wo contrast   Final Result by Salvador Segovia MD (07/13 3214)      1    Severe right hydroureteronephrosis with abrupt caliber change at distal right ureter without obvious direct extrinsic mass affect  No calculus  Associated significant right perirenal and posterior pararenal fat stranding with fluid within posterior    pararenal space  Correlate with urinalysis for infection  Recommend urology consultation and further evaluation with CT urogram       2   Enlarged prostate with mass effect upon the base of urinary bladder  Possible separate irregular mucosal thickening / mass along the posterior inferior urinary bladder wall versus continuation of enlarged prostate projection  3   Right greater than left paraortic and iliac chain lymphadenopathy  4   Right pleural effusion and small subjacent consolidation likely atelectasis  Occult infection is not excluded  5   Large infrarenal abdominal aortic aneurysm measuring 5 6 x 5 5 cm  Recommend vascular service consultation  6   Left hepatic lobe 0 6 cm indeterminate lesion  7   Chronic severe L3 compression deformity with posterior buckling of superior endplate resulting in severe canal stenosis  The study was marked in [facility] for immediate notification        Workstation performed: [id number]           Results from last 7 days   Lab Units 07/14/22  1037   SARS-COV-2  Negative     Results from last 7 days   Lab Units 07/14/22  0548 07/13/22  1140   WBC Thousand/uL 8 47 9 74   HEMOGLOBIN g/dL 12 0 11 9*   HEMATOCRIT % 38 1 37 8   PLATELETS Thousands/uL 310 297   NEUTROS ABS Thousands/µL  --  6 95         Results from last 7 days   Lab Units 07/15/22  0544 07/14/22  0548 07/13/22  1140   SODIUM mmol/L 134* 133* 134*   POTASSIUM mmol/L 3 8 4 0 4 0   CHLORIDE mmol/L 101 100 100   CO2 mmol/L 23 24 26   ANION GAP mmol/L 10 9 8   BUN mg/dL 13 16 15   CREATININE mg/dL 0 93 0 91 1 05   EGFR ml/min/1 73sq m 80 82 69   CALCIUM mg/dL 8 1* 8 8 8 7   MAGNESIUM mg/dL  --  2 0  --      Results from last 7 days   Lab Units 07/13/22  1140   AST U/L 40   ALT U/L 54   ALK PHOS U/L 108   TOTAL PROTEIN g/dL 7 8   ALBUMIN g/dL 2 7*   TOTAL BILIRUBIN mg/dL 0 55         Results from last 7 days   Lab Units 07/15/22  0544 07/14/22  0548 07/13/22  1140   GLUCOSE RANDOM mg/dL 134 102 110             No results found for: BETA-HYDROXYBUTYRATE                           Results from last 7 days   Lab Units 07/13/22  1140   PROTIME seconds 14 4   INR  1 15   PTT seconds 30         Results from last 7 days   Lab Units 07/14/22  0548 07/13/22  1140   PROCALCITONIN ng/ml 0 08 0 08     Results from last 7 days   Lab Units 07/13/22  1140   LACTIC ACID mmol/L 0 8                                         Results from last 7 days   Lab Units 07/13/22  1158   CLARITY UA  Clear   COLOR UA  Yellow   SPEC GRAV UA  1 025   PH UA  6 0   GLUCOSE UA mg/dl Negative   KETONES UA mg/dl Negative   BLOOD UA  Trace-Intact*   PROTEIN UA mg/dl 30 (1+)*   NITRITE UA  Negative   BILIRUBIN UA  Negative   UROBILINOGEN UA E U /dl 1 0   LEUKOCYTES UA  Negative   WBC UA /hpf None Seen   RBC UA /hpf 1-2   BACTERIA UA /hpf Occasional   EPITHELIAL CELLS WET PREP /hpf None Seen     Results from last 7 days   Lab Units 07/14/22  1037   INFLUENZA A PCR  Negative   INFLUENZA B PCR  Negative   RSV PCR  Negative                             Results from last 7 days   Lab Units 07/13/22  1158 07/13/22  1140   BLOOD CULTURE   --  No Growth After 5 Days  No Growth After 5 Days  URINE CULTURE  <10,000 cfu/ml   --                ED Treatment:   Medication Administration from 07/13/2022 1018 to 07/13/2022 2032       Date/Time Order Dose Route Action     07/13/2022 1158 cefTRIAXone (ROCEPHIN) IVPB (premix in dextrose) 2,000 mg 50 mL 2,000 mg Intravenous New Bag        History reviewed  No pertinent past medical history    Present on Admission:   Hydronephrosis of right kidney   Prostate enlargement   AAA (abdominal aortic aneurysm) without rupture (HCC)   Opacity of lung on imaging study      Admitting Diagnosis: Pyelonephritis [N12]  Flank pain [R10 9]  Prostate mass [N42 89]  Other hydronephrosis [N13 39]  Abdominal aortic aneurysm (AAA) without rupture (HCC) [I71 4]  Age/Sex: 78 y o  male  Admission Orders:  Consult urology  Scd/foot pumps    Scheduled Medications:  cefTRIAXone, 1,000 mg, Intravenous, Q24H  enoxaparin, 40 mg, Subcutaneous, Daily      Continuous IV Infusions:  sodium chloride, 75 mL/hr, Intravenous, Continuous      PRN Meds:  acetaminophen, 650 mg, Oral, Q6H PRN      Network Utilization Review Department  ATTENTION: Please call with any questions or concerns to [phone] and carefully listen to the prompts so that you are directed to the right person  All voicemails are confidential   Fe Bee all requests for admission clinical reviews, approved or denied determinations and any other requests to dedicated fax number below belonging to the campus where the patient is receiving treatment   List of dedicated fax numbers for the Facilities:  [address] DENIALS (Administrative/Medical Necessity) [phone]   [address] (Maternity/NICU/Pediatrics) [phone]   [address] [address] [place] [address] [phone]           PRESENTATION DATE/TIME: 7/13/2022 10:45 AM  OBS ADMISSION DATE/TIME:  Admission Orders (From admission, onward)     Ordered        07/13/22 1505  Place in Observation  Once                      DISCHARGE DATE/TIME: 7/15/2022  3:32 PM   DISPOSITION: Home/Self Care  Attending Physician: Johana Hutchison [[id number]] [unfilled]    Facility: [address] [place] [address] Utilization Review Department  Phone: [phone]; Fax [phone]  [email or web address]  ATTENTION: Please call with any questions or concerns to [phone]  and carefully listen to the prompts so that you are directed to the right person  Send all requests for admission clinical reviews, approved or denied determinations and any other requests to fax [phone]   All voicemails are confidential